# Patient Record
Sex: FEMALE | Race: WHITE | NOT HISPANIC OR LATINO | URBAN - METROPOLITAN AREA
[De-identification: names, ages, dates, MRNs, and addresses within clinical notes are randomized per-mention and may not be internally consistent; named-entity substitution may affect disease eponyms.]

---

## 2023-03-08 NOTE — ASU PATIENT PROFILE, ADULT - HEALTHCARE INFORMATION NEEDED, PROFILE
post operative instructions, medication to pharmacy. and post surgical  fallow up appointment with MD

## 2023-03-08 NOTE — ASU PATIENT PROFILE, ADULT - NS PRO LAST MENSTRUAL
----- Message from Tracy Felix sent at 10/10/2018  1:40 PM CDT -----  Contact: ms fonseca-mom  pls work new medicaid pt in  [place], has referral from dr brunner...[phone]  
appt given 10/10/18/1445/sf  
not applicable

## 2023-03-08 NOTE — ASU PATIENT PROFILE, ADULT - NSICDXPASTSURGICALHX_GEN_ALL_CORE_FT
PAST SURGICAL HISTORY:  No significant past surgical history PAST SURGICAL HISTORY:  H/O wrist surgery right

## 2023-03-08 NOTE — ASU PATIENT PROFILE, ADULT - NSICDXPASTMEDICALHX_GEN_ALL_CORE_FT
PAST MEDICAL HISTORY:  No pertinent past medical history PAST MEDICAL HISTORY:  No pertinent past medical history      PAST MEDICAL HISTORY:  HLD (hyperlipidemia) no meds    Mild HTN no meds

## 2023-03-08 NOTE — ASU PATIENT PROFILE, ADULT - NS PREOP UNDERSTANDS INFO
spoke to patient to be NPO/NO solid foods after 2200 tonight.,  allow to drink water till 12mn. , dress comfortable, leave all valuable things at home,  Bring  ID photo, insurance and vaccination cards.  escort arranged address  and telephone given to patient/yes

## 2023-03-08 NOTE — ASU PATIENT PROFILE, ADULT - FALL HARM RISK - UNIVERSAL INTERVENTIONS
Bed in lowest position, wheels locked, appropriate side rails in place/Call bell, personal items and telephone in reach/Instruct patient to call for assistance before getting out of bed or chair/Non-slip footwear when patient is out of bed/Erin to call system/Physically safe environment - no spills, clutter or unnecessary equipment/Purposeful Proactive Rounding/Room/bathroom lighting operational, light cord in reach

## 2023-03-08 NOTE — ASU PATIENT PROFILE, ADULT - SITE
Left [place] bone Conduction implant , and mastoidectomy laterality : left leica ENT microscope , facial nerve monitor

## 2023-03-09 ENCOUNTER — OUTPATIENT (OUTPATIENT)
Dept: OUTPATIENT SERVICES | Facility: HOSPITAL | Age: 58
LOS: 1 days | Discharge: HOME | End: 2023-03-09

## 2023-03-09 VITALS
DIASTOLIC BLOOD PRESSURE: 90 MMHG | RESPIRATION RATE: 16 BRPM | OXYGEN SATURATION: 98 % | WEIGHT: 202.83 LBS | SYSTOLIC BLOOD PRESSURE: 160 MMHG | TEMPERATURE: 98 F | HEIGHT: 63 IN | HEART RATE: 84 BPM

## 2023-03-09 VITALS
DIASTOLIC BLOOD PRESSURE: 62 MMHG | RESPIRATION RATE: 15 BRPM | OXYGEN SATURATION: 95 % | HEART RATE: 89 BPM | SYSTOLIC BLOOD PRESSURE: 127 MMHG

## 2023-03-09 DIAGNOSIS — Z98.890 OTHER SPECIFIED POSTPROCEDURAL STATES: Chronic | ICD-10-CM

## 2023-03-09 DEVICE — BONE WAX 2.5GM: Type: IMPLANTABLE DEVICE | Status: FUNCTIONAL

## 2023-03-09 DEVICE — IMP COCH OSI200: Type: IMPLANTABLE DEVICE | Status: FUNCTIONAL

## 2023-03-09 DEVICE — SURGIFOAM PAD 8CM X 12.5CM X 10MM (100): Type: IMPLANTABLE DEVICE | Status: FUNCTIONAL

## 2023-03-09 DEVICE — IMP BAHA BI300 4MM: Type: IMPLANTABLE DEVICE | Status: FUNCTIONAL

## 2023-03-09 RX ORDER — APREPITANT 80 MG/1
40 CAPSULE ORAL ONCE
Refills: 0 | Status: COMPLETED | OUTPATIENT
Start: 2023-03-09 | End: 2023-03-09

## 2023-03-09 RX ORDER — ACETAMINOPHEN 500 MG
1000 TABLET ORAL ONCE
Refills: 0 | Status: COMPLETED | OUTPATIENT
Start: 2023-03-09 | End: 2023-03-09

## 2023-03-09 RX ADMIN — APREPITANT 40 MILLIGRAM(S): 80 CAPSULE ORAL at 10:08

## 2023-03-09 RX ADMIN — Medication 1000 MILLIGRAM(S): at 10:09

## 2023-03-09 NOTE — ASU DISCHARGE PLAN (ADULT/PEDIATRIC) - CARE PROVIDER_API CALL
Jose Fierro)  Neurotology; Otolaryngology  [address] [facility], [address]  [place], [place] [address]  Phone: [phone]  Fax: [phone]  Established Patient  Follow Up Time:

## 2025-06-20 NOTE — PRE-ANESTHESIA EVALUATION ADULT - NSANTHRISKNONERD_GEN_ALL_CORE
[FreeTextEntry1] : Abd mesenteric duplex ordered to eval flow and EVAR, showed: No risk alerts present

## (undated) DEVICE — STRYKER CORE IRRIGATION DRILL CASSETTE DISP

## (undated) DEVICE — DRAPE IRRIGATION POUCH 19X23"

## (undated) DEVICE — DRAPE MICROSCOPE ZEISS

## (undated) DEVICE — SOL IRR BAG NS 0.9% 3000ML

## (undated) DEVICE — ELCTR BOVIE TIP BLADE INSULATED 2.75" EDGE

## (undated) DEVICE — PACKING 1/2"

## (undated) DEVICE — DURABLE MEDICAL EQUIPMENT: Type: DURABLE MEDICAL EQUIPMENT

## (undated) DEVICE — PREP ALCOHOL PAD MED

## (undated) DEVICE — SUT ETHILON 4-0 18" FS-2

## (undated) DEVICE — BUR STRYKER FLUTED ROUND SOFT TOUCH 3MM

## (undated) DEVICE — PREP BETADINE SPONGE STICKS

## (undated) DEVICE — SUT MONOCRYL 5-0 18" P-3 UNDYED

## (undated) DEVICE — DRSG STERISTRIPS 0.5 X 4"

## (undated) DEVICE — GLV 7.5 PROTEXIS (WHITE)

## (undated) DEVICE — DRAPE VARI-LENS2 MICROSCPOPE 68MM

## (undated) DEVICE — SLV COMPRESSION KNEE MED

## (undated) DEVICE — PACK BLEPHAROPLASTY

## (undated) DEVICE — BUR STRYKER DIAMOND ROUND COARSE 2MM

## (undated) DEVICE — DRSG GLASSOCK ADULT

## (undated) DEVICE — PACK TYMPANOMASTOIDECTOMY LF

## (undated) DEVICE — BUR STRYKER FLUTED ROUND SOFT TOUCH 4MM

## (undated) DEVICE — BUR STRYKER DIAMOND ROUND COARSE 3MM

## (undated) DEVICE — KNIFE ALCON MVR V-LANCE 20G (WHITE)

## (undated) DEVICE — ELCTR NDL SUBDERMAL 2 CHANNEL

## (undated) DEVICE — DRAPE STERI-DRAPE MEDIUM W APERTURE

## (undated) DEVICE — PACK NASAL SINUS KEN 3.5 X 1.2 X 1.2CM

## (undated) DEVICE — DRAPE APERTURE

## (undated) DEVICE — DRAPE TOWEL BLUE 17" X 24"

## (undated) DEVICE — VENODYNE/SCD SLEEVE CALF MEDIUM

## (undated) DEVICE — DRSG GAUZE FLUFF 1PLY 18X30"

## (undated) DEVICE — DRSG KLING 4"

## (undated) DEVICE — DRSG TEGADERM 2.5X3"

## (undated) DEVICE — DRSG KERLIX ROLL 4.5"

## (undated) DEVICE — BEAVER BLADE MIN-BLADE ROUNDED TIP 1-SIDE SHARP (GREEN)

## (undated) DEVICE — SUT CHROMIC 3-0 27" SH

## (undated) DEVICE — MARKING PEN W RULER

## (undated) DEVICE — BUR STRYKER DIAMOND ROUND COARSE 5MM

## (undated) DEVICE — NDL NERVE STIM 22GA X 2IN 30 DEG

## (undated) DEVICE — GOWN SLEEVES

## (undated) DEVICE — DRSG MASTISOL

## (undated) DEVICE — DRAIN PENROSE .25" X 18" LATEX

## (undated) DEVICE — BUR STRYKER FLUTED ROUND SOFT TOUCH 5MM

## (undated) DEVICE — BUR STRYKER DIAMOND ROUND COARSE 4MM

## (undated) DEVICE — CATH IV SAFE INSYTE 16G X 1.16" (GRAY)

## (undated) DEVICE — WARMING BLANKET LOWER ADULT